# Patient Record
Sex: MALE | Race: WHITE
[De-identification: names, ages, dates, MRNs, and addresses within clinical notes are randomized per-mention and may not be internally consistent; named-entity substitution may affect disease eponyms.]

---

## 2022-10-13 PROBLEM — Z00.00 ENCOUNTER FOR PREVENTIVE HEALTH EXAMINATION: Status: ACTIVE | Noted: 2022-10-13

## 2022-10-14 ENCOUNTER — APPOINTMENT (OUTPATIENT)
Dept: OTOLARYNGOLOGY | Facility: CLINIC | Age: 32
End: 2022-10-14

## 2022-10-14 DIAGNOSIS — Z78.9 OTHER SPECIFIED HEALTH STATUS: ICD-10-CM

## 2022-10-14 DIAGNOSIS — H61.21 IMPACTED CERUMEN, RIGHT EAR: ICD-10-CM

## 2022-10-14 DIAGNOSIS — H93.299 OTHER ABNORMAL AUDITORY PERCEPTIONS, UNSPECIFIED EAR: ICD-10-CM

## 2022-10-14 DIAGNOSIS — Z80.9 FAMILY HISTORY OF MALIGNANT NEOPLASM, UNSPECIFIED: ICD-10-CM

## 2022-10-14 DIAGNOSIS — F17.210 NICOTINE DEPENDENCE, CIGARETTES, UNCOMPLICATED: ICD-10-CM

## 2022-10-14 DIAGNOSIS — H90.3 SENSORINEURAL HEARING LOSS, BILATERAL: ICD-10-CM

## 2022-10-14 PROCEDURE — 92550 TYMPANOMETRY & REFLEX THRESH: CPT | Mod: 52

## 2022-10-14 PROCEDURE — 69210 REMOVE IMPACTED EAR WAX UNI: CPT

## 2022-10-14 PROCEDURE — 99204 OFFICE O/P NEW MOD 45 MIN: CPT | Mod: 25

## 2022-10-14 PROCEDURE — G0268 REMOVAL OF IMPACTED WAX MD: CPT

## 2022-10-14 PROCEDURE — 92557 COMPREHENSIVE HEARING TEST: CPT

## 2022-10-14 NOTE — ASSESSMENT
[FreeTextEntry1] : LEONIDES MEADOWS felt better after removal of impacted cerumen AD.\par Audiogram reveals a moderately severe bilateral sensorineural hearing  loss.I suggested RTC in one year.

## 2022-10-14 NOTE — REASON FOR VISIT
[Initial Consultation] : an initial consultation for [Ear Drainage] : ear drainage [FreeTextEntry2] : ear inflammation

## 2022-10-14 NOTE — REVIEW OF SYSTEMS
[Negative] : Heme/Lymph [Ear Pain] : ear pain [Hearing Loss] : hearing loss [Patient Intake Form Reviewed] : Patient intake form was reviewed

## 2022-10-14 NOTE — CONSULT LETTER
[Consult Letter:] : I had the pleasure of evaluating your patient, [unfilled]. [Please see my note below.] : Please see my note below. [Sincerely,] : Sincerely, [Dear  ___] : Dear  [unfilled], [FreeTextEntry3] : Carter Loera MD\par

## 2022-10-14 NOTE — HISTORY OF PRESENT ILLNESS
[de-identified] : LEONIDES MEADOWS is a 32 year man with a history of hearing loss using hearing aids about 20 years. He gets popping in his right ear. his parents have hearing loss.